# Patient Record
Sex: MALE | Race: WHITE | NOT HISPANIC OR LATINO | URBAN - METROPOLITAN AREA
[De-identification: names, ages, dates, MRNs, and addresses within clinical notes are randomized per-mention and may not be internally consistent; named-entity substitution may affect disease eponyms.]

---

## 2023-01-24 ENCOUNTER — OFFICE VISIT (OUTPATIENT)
Dept: URGENT CARE | Facility: CLINIC | Age: 43
End: 2023-01-24

## 2023-01-24 VITALS
HEIGHT: 71 IN | WEIGHT: 175 LBS | HEART RATE: 89 BPM | RESPIRATION RATE: 18 BRPM | DIASTOLIC BLOOD PRESSURE: 75 MMHG | TEMPERATURE: 99 F | SYSTOLIC BLOOD PRESSURE: 120 MMHG | BODY MASS INDEX: 24.5 KG/M2 | OXYGEN SATURATION: 98 %

## 2023-01-24 DIAGNOSIS — N48.9 PENILE LESION: Primary | ICD-10-CM

## 2023-01-24 LAB — T PALLIDUM AB SER QL: NONREACTIVE

## 2023-01-24 PROCEDURE — 86780 TREPONEMA PALLIDUM: CPT | Performed by: FAMILY MEDICINE

## 2023-01-24 PROCEDURE — 99202 PR OFFICE/OUTPT VISIT, NEW, LEVL II, 15-29 MIN: ICD-10-PCS | Mod: ,,,

## 2023-01-24 PROCEDURE — 99202 OFFICE O/P NEW SF 15 MIN: CPT | Mod: ,,,

## 2023-01-24 PROCEDURE — 36415 COLL VENOUS BLD VENIPUNCTURE: CPT

## 2023-01-24 PROCEDURE — 87529 HSV DNA AMP PROBE: CPT | Performed by: FAMILY MEDICINE

## 2023-01-24 NOTE — PATIENT INSTRUCTIONS
We will call you with your labs results in the next few days.   Avoid sexual activity until results are back  Keep the area clean and dry   We will consider a urology referral if all test are  negative and symptoms persist.   For any increased in pain, fever, drainage, or abdominal pain seek care immediately

## 2023-01-24 NOTE — PROGRESS NOTES
"Subjective:       Patient ID: Abe Downs is a 42 y.o. male.    Vitals:  height is 5' 11" (1.803 m) and weight is 79.4 kg (175 lb). His temperature is 98.7 °F (37.1 °C). His blood pressure is 120/75 and his pulse is 89. His respiration is 18 and oxygen saturation is 98%.     Chief Complaint: No chief complaint on file.    42 y.o. male presents to clinic w/ c/o blister on penis x3wks.     Constitution: Negative for fatigue and fever.   HENT: Negative.     Neck: neck negative.   Cardiovascular: Negative.    Eyes: Negative.    Respiratory: Negative.     Gastrointestinal: Negative.    Endocrine: negative.   Genitourinary:  Positive for genital sore. Negative for dysuria and penile discharge.   Musculoskeletal: Negative.      Objective:      Physical Exam   Constitutional: He is oriented to person, place, and time.  Non-toxic appearance. He does not appear ill. No distress.   HENT:   Head: Normocephalic.   Ears:   Right Ear: External ear normal.   Left Ear: External ear normal.   Nose: Nose normal.   Eyes: Lids are normal.   Cardiovascular: Normal rate.   Pulmonary/Chest: Effort normal.   Abdominal: Normal appearance. flat abdomen   Genitourinary: Penis exhibits lesions (approx 1 cm flat round erythematous lesions with no obvious vesicles, papules, ulcers or drainage, no bleeding noted).   Neurological: He is alert and oriented to person, place, and time.   Skin: Skin is warm and dry. chaperone present         Assessment:       1. Penile lesion          Plan:         Penile lesion  -     SYPHILIS ANTIBODY (WITH REFLEX RPR); Future; Expected date: 01/24/2023  -     Herpes simplex Virus (HSV) Type 1 & 2 DNA by PCR; Future; Expected date: 01/24/2023    We will call you with your labs results in the next few days.   Avoid sexual activity until results are back  Keep the area clean and dry   We will consider a urology referral if all test are  negative and symptoms persist.   For any increased in pain, fever, drainage, " or abdominal pain seek care immediately

## 2023-01-30 DIAGNOSIS — N48.9 PENILE LESION: Primary | ICD-10-CM

## 2023-01-30 LAB — BEAKER SEE SCANNED REPORT: NORMAL

## 2024-02-04 ENCOUNTER — OFFICE VISIT (OUTPATIENT)
Dept: URGENT CARE | Facility: CLINIC | Age: 44
End: 2024-02-04
Payer: COMMERCIAL

## 2024-02-04 VITALS
HEIGHT: 71 IN | HEART RATE: 69 BPM | BODY MASS INDEX: 24.5 KG/M2 | RESPIRATION RATE: 20 BRPM | SYSTOLIC BLOOD PRESSURE: 122 MMHG | OXYGEN SATURATION: 100 % | DIASTOLIC BLOOD PRESSURE: 83 MMHG | TEMPERATURE: 99 F | WEIGHT: 175 LBS

## 2024-02-04 DIAGNOSIS — K04.7 DENTAL INFECTION: Primary | ICD-10-CM

## 2024-02-04 DIAGNOSIS — K08.89 PAIN, DENTAL: ICD-10-CM

## 2024-02-04 PROCEDURE — 99213 OFFICE O/P EST LOW 20 MIN: CPT | Mod: ,,,

## 2024-02-04 RX ORDER — CLINDAMYCIN HYDROCHLORIDE 300 MG/1
300 CAPSULE ORAL EVERY 8 HOURS
Qty: 21 CAPSULE | Refills: 0 | Status: SHIPPED | OUTPATIENT
Start: 2024-02-04 | End: 2024-02-11

## 2024-02-04 NOTE — PATIENT INSTRUCTIONS
Take antibiotics until complete.  Drink plenty of fluids. Get plenty of rest.   Tylenol or ibuprofen as needed.    Follow with dentist tomorrow.  Go to the ER with any significant change or worsening of symptoms.     Follow up with your primary care doctor.    Smoking cessation strongly encouraged.  Assistance offered, information will be provided.  If not ready to quit now, patient will contact this clinic in the future if I can be of any specific health related to the discontinuation of smoking/tobacco use.

## 2024-02-04 NOTE — PROGRESS NOTES
"Subjective:      Patient ID: Abe Downs is a 43 y.o. male.    Vitals:  height is 5' 11" (1.803 m) and weight is 79.4 kg (175 lb). His temperature is 98.7 °F (37.1 °C). His blood pressure is 122/83 and his pulse is 69. His respiration is 20 and oxygen saturation is 100%.     Chief Complaint: broken tooth (Back tooth broken on Friday. Says painful and swollen)    Patient is a 43-year-old male who presents to urgent care clinic with complaints of dental pain beginning Friday after noting a broken tooth.  He denies any known injury or trauma.  He called his dentist on Friday and was told he is able to make it through the weekend he could present to the dental office on Monday for evaluation.  He was told if he began to have swelling or increased pain he should presents to the urgent care for antibiotic prescription.  Patient reports swelling and pain to the area worsening last night, awoke this morning with swelling..  Denies fever, body aches, chills, neck stiffness, rash, difficulty swallowing, shortness of breath.  Patient's chart lists a penicillin allergy however he is unsure of any known allergy, reports he has never had a reaction to penicillin however he believes his parents were allergic.  Denies any known anaphylaxis, swelling, shortness breast reaction.        Skin:  Negative for erythema.      Objective:     Physical Exam   Constitutional: He is oriented to person, place, and time. He appears well-developed.   HENT:   Head: Normocephalic and atraumatic. Head is without abrasion, without contusion and without laceration.   Ears:   Right Ear: External ear normal.   Left Ear: External ear normal.   Nose: Nose normal.   Mouth/Throat: Oropharynx is clear and moist and mucous membranes are normal. No oral lesions. Abnormal dentition. No dental abscesses.          Comments: No obvious abscess or oral lesion noted, no significant swelling, erythema noted to the anterior of the mouth.  There is swelling noted " on the outer cheek.  Mild tenderness noted to left upper gumline with cracked tooth present  Eyes: Conjunctivae, EOM and lids are normal.   Neck: Trachea normal and phonation normal. Neck supple.   Cardiovascular: Normal rate, regular rhythm and normal heart sounds.   Pulmonary/Chest: Effort normal and breath sounds normal. No stridor. No respiratory distress.   Musculoskeletal: Normal range of motion.         General: Normal range of motion.   Neurological: He is alert and oriented to person, place, and time.   Skin: Skin is warm, dry, intact and no rash. Capillary refill takes less than 2 seconds. No abrasion, No burn, No bruising, No erythema and No ecchymosis   Psychiatric: His speech is normal and behavior is normal. Judgment and thought content normal.   Nursing note and vitals reviewed.      Assessment:     1. Dental infection    2. Pain, dental        Plan:       Dental infection  -     clindamycin (CLEOCIN) 300 MG capsule; Take 1 capsule (300 mg total) by mouth every 8 (eight) hours. for 7 days  Dispense: 21 capsule; Refill: 0    Pain, dental    Patient has penicillin allergy in his chart, denies any known penicillin allergy history, reports this may have been an allergy from his parents.  However will avoid Augmentin, penicillin at this time.  Will treat with clindamycin as he is following with dentist tomorrow.       Patient declines need for prescription pain medicine.  Would like antibiotic and he will follow with dentist tomorrow.     Take antibiotics until complete.  Drink plenty of fluids. Get plenty of rest.   Tylenol or ibuprofen as needed.    Follow with dentist tomorrow.  Go to the ER with any significant change or worsening of symptoms.     Follow up with your primary care doctor.    Smoking cessation strongly encouraged.  Assistance offered, information will be provided.  If not ready to quit now, patient will contact this clinic in the future if I can be of any specific health related to the  discontinuation of smoking/tobacco use.

## 2024-02-09 ENCOUNTER — TELEPHONE (OUTPATIENT)
Dept: URGENT CARE | Facility: CLINIC | Age: 44
End: 2024-02-09
Payer: COMMERCIAL

## 2024-02-09 RX ORDER — PREDNISONE 20 MG/1
20 TABLET ORAL 2 TIMES DAILY
Qty: 10 TABLET | Refills: 0 | Status: SHIPPED | OUTPATIENT
Start: 2024-02-09 | End: 2024-02-14

## 2024-02-09 RX ORDER — CEPHALEXIN 500 MG/1
500 CAPSULE ORAL EVERY 6 HOURS
Qty: 28 CAPSULE | Refills: 0 | Status: SHIPPED | OUTPATIENT
Start: 2024-02-09 | End: 2024-02-16

## 2024-02-09 NOTE — TELEPHONE ENCOUNTER
Patient call verbalizing that he does not have an appointment with his dentist until Wednesday he noticed he had increased swelling this morning he has an appointment with an orthodontic surgeon and the clindamycin was not refilled by his family dentist.  He is requesting further antibiotics and guidance.  He has not require any further pain medication now as he has been using 800 mg ibuprofen successfully.  Discussed that he has taken amoxicillin in the past and has a questionable penicillin allergy.  Did discuss prescribing Keflex but to monitor for any signs a sensitivity to this medication and to call the clinic and discontinue antibiotics immediately if this did occur.  Also prescription of prednisone sent to pharmacy.

## 2024-03-18 ENCOUNTER — OFFICE VISIT (OUTPATIENT)
Dept: URGENT CARE | Facility: CLINIC | Age: 44
End: 2024-03-18
Payer: COMMERCIAL

## 2024-03-18 DIAGNOSIS — Z53.21 PATIENT LEFT BEFORE EVALUATION BY PHYSICIAN: Primary | ICD-10-CM

## 2024-03-18 PROCEDURE — 99499 UNLISTED E&M SERVICE: CPT | Mod: ,,,

## 2025-04-08 ENCOUNTER — OFFICE VISIT (OUTPATIENT)
Dept: URGENT CARE | Facility: CLINIC | Age: 45
End: 2025-04-08
Payer: COMMERCIAL

## 2025-04-08 VITALS
SYSTOLIC BLOOD PRESSURE: 117 MMHG | BODY MASS INDEX: 24.5 KG/M2 | TEMPERATURE: 97 F | HEIGHT: 71 IN | OXYGEN SATURATION: 100 % | WEIGHT: 175 LBS | DIASTOLIC BLOOD PRESSURE: 77 MMHG | HEART RATE: 90 BPM | RESPIRATION RATE: 20 BRPM

## 2025-04-08 DIAGNOSIS — L25.9 CONTACT DERMATITIS, UNSPECIFIED CONTACT DERMATITIS TYPE, UNSPECIFIED TRIGGER: Primary | ICD-10-CM

## 2025-04-08 PROCEDURE — 99213 OFFICE O/P EST LOW 20 MIN: CPT | Mod: ,,, | Performed by: CLINIC/CENTER

## 2025-04-08 RX ORDER — PREDNISONE 10 MG/1
TABLET ORAL
Qty: 30 TABLET | Refills: 0 | Status: SHIPPED | OUTPATIENT
Start: 2025-04-08 | End: 2025-04-20

## 2025-04-08 NOTE — PROGRESS NOTES
"Subjective:      Patient ID: Abe Downs is a 44 y.o. male.    Vitals:  height is 5' 11" (1.803 m) and weight is 79.4 kg (175 lb). His tympanic temperature is 97.2 °F (36.2 °C). His blood pressure is 117/77 and his pulse is 90. His respiration is 20 and oxygen saturation is 100%.     Chief Complaint: Poison Ivy     Patient is a 44 y.o. male who presents to urgent care with complaints of poison oak all over body onset 2 days ago.  Alleviating factors include N/A with no relief. Patient denies N/V/D HA BA .  Patient states that he was ripping down checo from his rent house and gotten to contact with poison ivy and poison oak.        Allergic/Immunologic: Positive for itching.      Objective:     Physical Exam   Constitutional: He is oriented to person, place, and time. He appears well-developed. He is cooperative.   HENT:   Head: Normocephalic and atraumatic.   Ears:   Right Ear: Hearing, tympanic membrane, external ear and ear canal normal.   Left Ear: Hearing, tympanic membrane, external ear and ear canal normal.   Nose: Nose normal. No mucosal edema or nasal deformity. No epistaxis. Right sinus exhibits no maxillary sinus tenderness and no frontal sinus tenderness. Left sinus exhibits no maxillary sinus tenderness and no frontal sinus tenderness.   Mouth/Throat: Uvula is midline, oropharynx is clear and moist and mucous membranes are normal. No trismus in the jaw. Normal dentition. No uvula swelling.   Eyes: Conjunctivae and lids are normal.   Neck: Trachea normal and phonation normal. Neck supple.   Cardiovascular: Normal rate, regular rhythm, normal heart sounds and normal pulses.   Pulmonary/Chest: Effort normal and breath sounds normal.   Abdominal: Normal appearance and bowel sounds are normal. Soft.   Musculoskeletal: Normal range of motion.         General: Normal range of motion.   Neurological: He is alert and oriented to person, place, and time. He exhibits normal muscle tone.   Skin: Skin is warm, " dry and intact.         Comments: Several patches of erythematous and noted on patient's arms consistent with contact dermatitis.   Psychiatric: His speech is normal and behavior is normal. Judgment and thought content normal.   Nursing note and vitals reviewed.      Assessment:     1. Contact dermatitis, unspecified contact dermatitis type, unspecified trigger        Plan:   Take Claritin or Benadryl as directed per package instructions. May cause sedation/drowsiness (safety precautions discussed).  Follow-up with your Primary Care Provider as needed.   Go to the emergency department with any significant change or worsening of symptoms.  Please monitor for any signs of infection such as worsening redness, swelling, pain, purulent discharge, fever, body aches, or chills and seek follow-up care as needed.     Contact dermatitis, unspecified contact dermatitis type, unspecified trigger  -     predniSONE (DELTASONE) 10 MG tablet; Take 4 tablets (40 mg total) by mouth once daily for 3 days, THEN 3 tablets (30 mg total) once daily for 3 days, THEN 2 tablets (20 mg total) once daily for 3 days, THEN 1 tablet (10 mg total) once daily for 3 days.  Dispense: 30 tablet; Refill: 0

## 2025-04-08 NOTE — PATIENT INSTRUCTIONS
Take Claritin or Benadryl as directed per package instructions. May cause sedation/drowsiness (safety precautions discussed).  Follow-up with your Primary Care Provider as needed.   Go to the emergency department with any significant change or worsening of symptoms.  Please monitor for any signs of infection such as worsening redness, swelling, pain, purulent discharge, fever, body aches, or chills and seek follow-up care as needed.